# Patient Record
Sex: MALE | Race: OTHER
[De-identification: names, ages, dates, MRNs, and addresses within clinical notes are randomized per-mention and may not be internally consistent; named-entity substitution may affect disease eponyms.]

---

## 2018-01-02 ENCOUNTER — HOSPITAL ENCOUNTER (EMERGENCY)
Dept: HOSPITAL 72 - EMR | Age: 28
Discharge: HOME | End: 2018-01-02
Payer: COMMERCIAL

## 2018-01-02 VITALS — DIASTOLIC BLOOD PRESSURE: 86 MMHG | SYSTOLIC BLOOD PRESSURE: 130 MMHG

## 2018-01-02 VITALS — WEIGHT: 194 LBS | BODY MASS INDEX: 28.73 KG/M2 | HEIGHT: 69 IN

## 2018-01-02 DIAGNOSIS — I82.492: Primary | ICD-10-CM

## 2018-01-02 LAB
ADD MANUAL DIFF: NO
ANION GAP SERPL CALC-SCNC: 11 MMOL/L (ref 5–15)
APTT BLD: 27 SEC (ref 23–33)
BASOPHILS NFR BLD AUTO: 0.5 % (ref 0–2)
BUN SERPL-MCNC: 14 MG/DL (ref 7–18)
CALCIUM SERPL-MCNC: 8.8 MG/DL (ref 8.5–10.1)
CHLORIDE SERPL-SCNC: 98 MMOL/L (ref 98–107)
CO2 SERPL-SCNC: 28 MMOL/L (ref 21–32)
CREAT SERPL-MCNC: 0.9 MG/DL (ref 0.55–1.3)
EOSINOPHIL NFR BLD AUTO: 1.4 % (ref 0–3)
ERYTHROCYTE [DISTWIDTH] IN BLOOD BY AUTOMATED COUNT: 10.5 % (ref 11.6–14.8)
HCT VFR BLD CALC: 44.2 % (ref 42–52)
HGB BLD-MCNC: 14.7 G/DL (ref 14.2–18)
INR PPP: 0.9 (ref 0.9–1.1)
LYMPHOCYTES NFR BLD AUTO: 17.2 % (ref 20–45)
MCV RBC AUTO: 88 FL (ref 80–99)
MONOCYTES NFR BLD AUTO: 10 % (ref 1–10)
NEUTROPHILS NFR BLD AUTO: 71 % (ref 45–75)
PLATELET # BLD: 377 K/UL (ref 150–450)
POTASSIUM SERPL-SCNC: 4.5 MMOL/L (ref 3.5–5.1)
RBC # BLD AUTO: 5.04 M/UL (ref 4.7–6.1)
SODIUM SERPL-SCNC: 137 MMOL/L (ref 136–145)
WBC # BLD AUTO: 9.8 K/UL (ref 4.8–10.8)

## 2018-01-02 PROCEDURE — 36415 COLL VENOUS BLD VENIPUNCTURE: CPT

## 2018-01-02 PROCEDURE — 99284 EMERGENCY DEPT VISIT MOD MDM: CPT

## 2018-01-02 PROCEDURE — 96372 THER/PROPH/DIAG INJ SC/IM: CPT

## 2018-01-02 PROCEDURE — 80048 BASIC METABOLIC PNL TOTAL CA: CPT

## 2018-01-02 PROCEDURE — 85025 COMPLETE CBC W/AUTO DIFF WBC: CPT

## 2018-01-02 PROCEDURE — 93971 EXTREMITY STUDY: CPT

## 2018-01-02 PROCEDURE — 85610 PROTHROMBIN TIME: CPT

## 2018-01-02 PROCEDURE — 85730 THROMBOPLASTIN TIME PARTIAL: CPT

## 2018-01-02 NOTE — EMERGENCY ROOM REPORT
History of Present Illness


General


Chief Complaint:  Pain


Source:  Patient, Significant Other





Present Illness


HPI


The patient is a 27-year-old male presenting for left lower leg swelling after 

surgery.  He states that he had knee surgery 4 days prior and then developed 

swelling of his left calf the day following.  Swelling and pain have progressed 

and is now a 9/10 dull ache.  He spoke with his doctor who told him to come to 

the emergency department.  He took an aspirin earlier today.  He denies any 

other symptoms including SOB, Cough, numbness,


Allergies:  


Coded Allergies:  


     No Known Allergies (Unverified , 1/2/18)





Patient History


Past Medical History:  see triage record


Pertinent Family History:  none


Reviewed Nursing Documentation:  PMH: Agreed, PSxH: Agreed





Nursing Documentation-PMH


Past Medical History:  No Stated History





Review of Systems


All Other Systems:  negative except mentioned in HPI





Physical Exam





Vital Signs








  Date Time  Temp Pulse Resp B/P (MAP) Pulse Ox O2 Delivery O2 Flow Rate FiO2


 


1/2/18 11:46 98.8 96 17 125/82 97   


 


1/2/18 16:50      Room Air  








Sp02 EP Interpretation:  reviewed, normal


General Appearance:  no apparent distress, alert, GCS 15, non-toxic


Head:  normocephalic, atraumatic


Eyes:  bilateral eye normal inspection, bilateral eye PERRL


ENT:  hearing grossly normal, normal pharynx, no angioedema, normal voice


Respiratory:  chest non-tender, lungs clear, normal breath sounds, speaking 

full sentences


Cardiovascular #2:  2+ dorsalis pedis (R), 2+ dorsalis pedis (L)


Musculoskeletal:  calf tenderness - L, swelling - L lower leg, tender - L calf


Neurologic:  alert, oriented x3, responsive, motor strength/tone normal, 

sensory intact, speech normal


Psychiatric:  judgement/insight normal, memory normal, mood/affect normal, no 

suicidal/homicidal ideation


Skin:  no rash, warm/dry, well hydrated


Lymphatic:  no adenopathy





Medical Decision Making


PA Attestation


Dr. Bonner is my supervising physician. Patient management was discussed with 

my supervising physician


Diagnostic Impression:  


 Primary Impression:  


 Acute deep vein thrombosis (DVT)


 Qualified Codes:  I82.492 - Acute embolism and thrombosis of other specified 

deep vein of left lower extremity


ER Course


The patient is a 27-year-old male presenting for left lower leg swelling after 

surgery





Differential diagnoses considered but not limited to: DVT, cellulitis, 

lymphedema, muscle strain, among others





Physical exam: No tachypnea or tachycardia.  Afebrile.


There is tenderness to palpation as well as significant swelling to the left 

lower leg from the ankle to the knee.  Skin is erythematous.  Warm and dry.  

There is tenderness to palpation over the mid calf.


Otherwise exam is unremarkable





Blood work unremarkable.  Venous duplex of the left lower leg shows an acute 

DVT of peripheral veins.





The patient is given one dose of Lovenox and will be discharged home with the 

same.  He was told he needs to followup with his primary doctor within 2 days 

for further treatment and bridge therapy.  He understands.  He will return to 

emergency Department if he is unable to followup with his primary doctor.


ER precautions given





Laboratory Tests








Test


  1/2/18


14:00


 


White Blood Count


  9.8 K/UL


(4.8-10.8)


 


Red Blood Count


  5.04 M/UL


(4.70-6.10)


 


Hemoglobin


  14.7 G/DL


(14.2-18.0)


 


Hematocrit


  44.2 %


(42.0-52.0)


 


Mean Corpuscular Volume 88 FL (80-99)  


 


Mean Corpuscular Hemoglobin


  29.1 PG


(27.0-31.0)


 


Mean Corpuscular Hemoglobin


Concent 33.2 G/DL


(32.0-36.0)


 


Red Cell Distribution Width


  10.5 %


(11.6-14.8)  L


 


Platelet Count


  377 K/UL


(150-450)


 


Mean Platelet Volume


  7.1 FL


(6.5-10.1)


 


Neutrophils (%) (Auto)


  71.0 %


(45.0-75.0)


 


Lymphocytes (%) (Auto)


  17.2 %


(20.0-45.0)  L


 


Monocytes (%) (Auto)


  10.0 %


(1.0-10.0)


 


Eosinophils (%) (Auto)


  1.4 %


(0.0-3.0)


 


Basophils (%) (Auto)


  0.5 %


(0.0-2.0)


 


Prothrombin Time


  9.4 SEC


(9.30-11.50)


 


Prothrombin Time INR 0.9 (0.9-1.1)  


 


PTT


  27 SEC (23-33)


 


 


Sodium Level


  137 MMOL/L


(136-145)


 


Potassium Level


  4.5 MMOL/L


(3.5-5.1)


 


Chloride Level


  98 MMOL/L


()


 


Carbon Dioxide Level


  28 MMOL/L


(21-32)


 


Anion Gap


  11 mmol/L


(5-15)


 


Blood Urea Nitrogen


  14 mg/dL


(7-18)


 


Creatinine


  0.9 MG/DL


(0.55-1.30)


 


Estimate Glomerular


Filtration Rate > 60 mL/min


(>60)


 


Glucose Level


  98 MG/DL


()


 


Calcium Level


  8.8 MG/DL


(8.5-10.1)








Lab Results Impression


No acute findings


CT/MRI/US Diagnostic Results


CT/MRI/US Diagnostic Results :  


   Imaging Test Ordered:  venous duplex


   Impression


Acute DVT of left lower peripheral vein





Last Vital Signs








  Date Time  Temp Pulse Resp B/P (MAP) Pulse Ox O2 Delivery O2 Flow Rate FiO2


 


1/2/18 16:50 98.6 72 16 130/86 97 Room Air  








Status:  improved


Disposition:  HOME, SELF-CARE


Condition:  Improved


Scripts


Enoxaparin* (LOVENOX*) 30 Mg/0.3 Ml Inj


90 MG SUBQ EVERY 12 HOURS for 3 Days, EA 0 Refills


   Prov: CONSTANCE RIVERA         1/2/18


Patient Instructions:  Deep Vein Thrombosis





Additional Instructions:  


I discussed my findings with the patient. All questions and concerns have been 

answered. Treatment and medication compliance have been addressed. 





You need to followup with her primary doctor for further treatment of your DVT.

  This has to be done within 2 days.  





Return to the emergency department if you are unable to followup with her 

primary doctor within 2 days. Return to the emergency department if she noticed 

other symptoms including shortness of breath, worsening pain, or worsening 

swelling











CONSTANCE RIVERA Jan 2, 2018 22:38

## 2018-01-04 NOTE — DIAGNOSTIC IMAGING REPORT
--------------- APPROVED REPORT --------------





CPT Code: 74732



Present Symptoms

Lower Extremity Pain:  Left 

Lower Extremity Edema: Left  





LEFT LEG: Venous imaging reveals acute thrombus in the paired peroneal veins. Imaging 

reveals patency of the common femoral, superficial femoral and popliteal veins. The 

posterior tibial and anterior tibial veins are also patent. The greater saphenous vein is 

also within normal limits. 



Incidental finding: Lymph node at the inguinal region measuring 2.6 cm x 0.6 cm x 2.3 cm.

## 2019-10-07 ENCOUNTER — HOSPITAL ENCOUNTER (EMERGENCY)
Dept: HOSPITAL 72 - EMR | Age: 29
Discharge: HOME | End: 2019-10-07
Payer: SELF-PAY

## 2019-10-07 VITALS — DIASTOLIC BLOOD PRESSURE: 78 MMHG | SYSTOLIC BLOOD PRESSURE: 129 MMHG

## 2019-10-07 VITALS — HEIGHT: 69 IN | BODY MASS INDEX: 29.62 KG/M2 | WEIGHT: 200 LBS

## 2019-10-07 DIAGNOSIS — K80.20: Primary | ICD-10-CM

## 2019-10-07 DIAGNOSIS — K76.0: ICD-10-CM

## 2019-10-07 DIAGNOSIS — K57.90: ICD-10-CM

## 2019-10-07 LAB
ADD MANUAL DIFF: NO
ALBUMIN SERPL-MCNC: 4 G/DL (ref 3.4–5)
ALBUMIN/GLOB SERPL: 1.1 {RATIO} (ref 1–2.7)
ALP SERPL-CCNC: 56 U/L (ref 46–116)
ALT SERPL-CCNC: 48 U/L (ref 12–78)
ANION GAP SERPL CALC-SCNC: 8 MMOL/L (ref 5–15)
AST SERPL-CCNC: 25 U/L (ref 15–37)
BASOPHILS NFR BLD AUTO: 0.6 % (ref 0–2)
BILIRUB SERPL-MCNC: 0.6 MG/DL (ref 0.2–1)
BUN SERPL-MCNC: 15 MG/DL (ref 7–18)
CALCIUM SERPL-MCNC: 9.2 MG/DL (ref 8.5–10.1)
CHLORIDE SERPL-SCNC: 105 MMOL/L (ref 98–107)
CO2 SERPL-SCNC: 27 MMOL/L (ref 21–32)
CREAT SERPL-MCNC: 1.1 MG/DL (ref 0.55–1.3)
EOSINOPHIL NFR BLD AUTO: 4.9 % (ref 0–3)
ERYTHROCYTE [DISTWIDTH] IN BLOOD BY AUTOMATED COUNT: 10.2 % (ref 11.6–14.8)
GLOBULIN SER-MCNC: 3.6 G/DL
HCT VFR BLD CALC: 47.1 % (ref 42–52)
HGB BLD-MCNC: 16.3 G/DL (ref 14.2–18)
LYMPHOCYTES NFR BLD AUTO: 29 % (ref 20–45)
MCV RBC AUTO: 86 FL (ref 80–99)
MONOCYTES NFR BLD AUTO: 7.4 % (ref 1–10)
NEUTROPHILS NFR BLD AUTO: 58.1 % (ref 45–75)
PLATELET # BLD: 265 K/UL (ref 150–450)
POTASSIUM SERPL-SCNC: 4 MMOL/L (ref 3.5–5.1)
RBC # BLD AUTO: 5.48 M/UL (ref 4.7–6.1)
SODIUM SERPL-SCNC: 140 MMOL/L (ref 136–145)
WBC # BLD AUTO: 7.1 K/UL (ref 4.8–10.8)

## 2019-10-07 PROCEDURE — 74177 CT ABD & PELVIS W/CONTRAST: CPT

## 2019-10-07 PROCEDURE — 36415 COLL VENOUS BLD VENIPUNCTURE: CPT

## 2019-10-07 PROCEDURE — 99284 EMERGENCY DEPT VISIT MOD MDM: CPT

## 2019-10-07 PROCEDURE — 85025 COMPLETE CBC W/AUTO DIFF WBC: CPT

## 2019-10-07 PROCEDURE — 80053 COMPREHEN METABOLIC PANEL: CPT

## 2019-10-07 NOTE — NUR
ED Nurse Note:

patient resting in bed with no acute distress. repositioned bed for comfort. 
waiting for ct scan.

## 2019-10-07 NOTE — DIAGNOSTIC IMAGING REPORT
Clinical Indication: Sharp left upper abdominal pain

 

Technique:   No oral contrast utilized, per emergency room physician request  IV

administration nonionic contrast. Venous phase spiral acquisition obtained through

the abdomen and pelvis. Multiplanar reconstructions were generated. Total dose length

product 1130 mGycm. CTDIvol(s) 18 mGy. Dose reduction achieved using automated

exposure control

 

 

Comparison: none

 

Findings: The appendix is normal. There are questionable small colonic diverticula.

No evidence of diverticulitis. No small bowel distention. No free or loculated

intraperitoneal gas or fluid is evident. The distal esophagus, stomach, duodenum are

unremarkable. A few prominent lymph nodes are seen in the left upper quadrant. A few

left upper quadrant small bowel loops are somewhat prominent with fluid contents

 

The liver is mildly hypoattenuating. No focal abnormality. The gallbladder contains

gallstones. No biliary ductal dilatation. The pancreas, spleen, adrenals, kidneys are

unremarkable. No renal or ureteral calculi, hydronephrosis, or hydroureter. The

bladder is unremarkable. No pelvic mass or adenopathy. No retroperitoneal mass or

adenopathy.

 

The included lung bases demonstrate some groundglass opacity which is probably the

result of motion artifact, possibly with some dependent atelectatic change as well.

The bones are unremarkable.

 

Impression: Slightly prominent left upper quadrant small bowel with liquid contents,

prominent regional lymph nodes. Could indicate enteritis changes. Correlate with

clinical findings

 

No acute process otherwise

 

Equivocal colonic diverticulosis. No evidence of diverticulitis

 

Fatty liver

 

Cholelithiasis

 

Normal appendix

 

Basilar groundglass opacity, likely due to a combination of respiratory motion

artifact and compressive atelectatic change

 

This essentially agrees with the preliminary interpretation provided overnight by

[facility] teleradiology service.

 

The CT scanner at [facility] [place] is accredited by the [employer]

[employer]iology and the scans are performed using protocols designed to limit radiation

exposure to as low as reasonably achievable to attain images of sufficient resolution

adequate for diagnostic evaluation.

## 2019-10-07 NOTE — EMERGENCY ROOM REPORT
History of Present Illness


General


Chief Complaint:  Abdominal Pain


Source:  Patient





Present Illness


HPI


29-year-old male with no significant past medical history is complaining of 

several weeks of left upper and lower quadrant abdominal pain.  Patient reports 

that in the past week it has been exacerbating rating a 7 out of 10 without 

radiation.  Complains of occasional constipation however denies any blood in 

his stool, denies fever and chills.  Denies nausea vomiting, diffuse abdominal 

pain.  Patient reports that every time that he coughs or does anything 

strenuous his pain exacerbates.  Patient has no guarding or tenderness noted on 

examination of abdomen.  Denies chest pain, shortness of breath, palpitation, 

and other associated symptoms.  Has not taken medication for symptom relief.  

Denies any fall or injury.


Allergies:  


Coded Allergies:  


     No Known Allergies (Unverified , 1/2/18)





Patient History


Past Medical History:  see triage record


Past Surgical History:  unable to obtain


Pertinent Family History:  none


Immunizations:  UTD


Reviewed Nursing Documentation:  PMH: Agreed; PSxH: Agreed





Nursing Documentation-PMH


Past Medical History:  No Stated History





Review of Systems


All Other Systems:  negative except mentioned in HPI





Physical Exam





Vital Signs








  Date Time  Temp Pulse Resp B/P (MAP) Pulse Ox O2 Delivery O2 Flow Rate FiO2


 


10/7/19 16:42 97.7 87 18 129/78 (95) 96 Room Air  








Sp02 EP Interpretation:  reviewed, normal


General Appearance:  no apparent distress, alert, GCS 15, non-toxic


Head:  normocephalic, atraumatic


Eyes:  bilateral eye normal inspection, bilateral eye PERRL


ENT:  hearing grossly normal, normal pharynx, no angioedema, normal voice


Neck:  full range of motion, supple/symm/no masses


Respiratory:  chest non-tender, lungs clear, normal breath sounds, no rhonchi, 

no wheezing, speaking full sentences


Cardiovascular #1:  normal inspection, normal peripheral pulses, regular rate, 

rhythm, no edema, no gallop, no murmur, normal capillary refill


Gastrointestinal:  normal bowel sounds, non tender, soft, no mass, no 

organomegaly, no peritonitis, no bruit, non-distended, no guarding, no hernia, 

no rebound


Rectal:  deferred


Genitourinary:  normal inspection, no CVA tenderness


Musculoskeletal:  back normal, gait/station normal, normal range of motion, non-

tender


Neurologic:  alert, oriented x3, responsive, motor strength/tone normal, 

sensory intact, speech normal


Psychiatric:  judgement/insight normal, memory normal, mood/affect normal, no 

suicidal/homicidal ideation


Skin:  no rash


Lymphatic:  no adenopathy





Medical Decision Making


PA Attestation


All my diagnosis and treatment plans were reviewed ad discussed with my 

supervising physician Dr. Leslie


Diagnostic Impression:  


 Primary Impression:  


 Diverticulosis


 Additional Impressions:  


 Cholelithiasis


 Hepatic steatosis


ER Course


29-year-old male with no significant past medical history is complaining of 

several weeks of left upper and lower quadrant abdominal pain.  Patient reports 

that in the past week it has been exacerbating rating a 7 out of 10 without 

radiation.  Complains of occasional constipation however denies any blood in 

his stool, denies fever and chills.  Denies nausea vomiting, diffuse abdominal 

pain.  Patient reports that every time that he coughs or does anything 

strenuous his pain exacerbates.  Patient has no guarding or tenderness noted on 

examination of abdomen.  Denies chest pain, shortness of breath, palpitation, 

and other associated symptoms.  Has not taken medication for symptom relief.  

Denies any fall or injury.





Ddx considered but are not limited to: appendicitis, cholecystis, gastritis, 

gastroenteritis, UTI, pyelonephritis, SBO, diverticulitis, influenza with GI 

manifestation, MI,














Vital signs: are WNL, pt. is afebrile








 H&PE are most consistent with: Diverticulosis without diverticulitis, 

cholelithiasis without cholecystitis, hepatic steatosis














ORDERS: abdominal CT, CBC, CMP, UA, Colace, Tylenol








ED INTERVENTIONS: None required at this time.























DISCHARGE: At this time pt. is stable for d/c to home. Will provide printed 

patient care instructions, and any necessary prescriptions. Care plan and 

follow up instructions have been discussed with the patient prior to discharge.

  Patient will be gastroenterologist take medication as directed however if any 

worsening symptoms such as blood in stool or fever and chills return to the 

emergency room


CT/MRI/US Diagnostic Results


CT/MRI/US Diagnostic Results :  


   Imaging Test Ordered:  abdominal CT


   Impression


No comparison





Impression:


-No acute abnormality to account for patient presentation.


-Mild clustered left upper quadrant small bowel loops without obstruction, 

adjacent inflammatory or edematous findings, and without findings of ischemia. 

This could represent a nonobstructed internal hernia.


-Scattered small bowel liquid contents could be incidental or could represent 

an infectious or inflammatory enteritis in the proper clinical context.





-Hepatic steatosis, correlate to exclude steatohepatitis.


-Cholelithiasis without findings to suggest acute cholecystitis.


-Colonic diverticulosis without acute diverticulitis.


- Normal appendix





Last Vital Signs








  Date Time  Temp Pulse Resp B/P (MAP) Pulse Ox O2 Delivery O2 Flow Rate FiO2


 


10/7/19 18:26  87 18   Room Air  


 


10/7/19 18:26 97.7   129/78 96   








Disposition:  HOME, SELF-CARE


Condition:  Stable


Scripts


Acetaminophen* (TYLENOL EXTRA STRENGTH*) 500 Mg Tablet


500 MG ORAL Q8H PRN for Prn Headache/Temp > 101, #30 TAB 0 Refills


   Prov: Mazin Marshall         10/7/19 


Docusate Sodium* (COLACE*) 100 Mg Capsule


100 MG ORAL TWICE A DAY, #14 CAP


   Prov: Mazin Marshall         10/7/19


Patient Instructions:  Diverticulosis





Additional Instructions:  


Observe dietary modification take medication as directed, follow-up with your 

primary care provider for referral to gastroenterologist.  If worsening pain in 

the right upper quadrant may be needing to be evaluated for inflammation and 

infection of gallbladder at this point no infection is noted only gallstones.











Mazin Marshall Oct 7, 2019 19:35